# Patient Record
Sex: MALE | Race: WHITE | Employment: FULL TIME | URBAN - NONMETROPOLITAN AREA
[De-identification: names, ages, dates, MRNs, and addresses within clinical notes are randomized per-mention and may not be internally consistent; named-entity substitution may affect disease eponyms.]

---

## 2018-02-06 ENCOUNTER — HOSPITAL ENCOUNTER (EMERGENCY)
Age: 28
Discharge: HOME | End: 2018-02-06
Attending: FAMILY MEDICINE
Payer: COMMERCIAL

## 2018-02-06 VITALS
RESPIRATION RATE: 16 BRPM | BODY MASS INDEX: 27.35 KG/M2 | HEART RATE: 66 BPM | OXYGEN SATURATION: 98 % | SYSTOLIC BLOOD PRESSURE: 135 MMHG | DIASTOLIC BLOOD PRESSURE: 62 MMHG | WEIGHT: 191 LBS | TEMPERATURE: 98.3 F | HEIGHT: 70 IN

## 2018-02-06 DIAGNOSIS — S65.511A LACERATION OF BLOOD VESSEL OF LEFT INDEX FINGER, INITIAL ENCOUNTER: Primary | ICD-10-CM

## 2018-02-06 PROCEDURE — 90715 TDAP VACCINE 7 YRS/> IM: CPT | Performed by: FAMILY MEDICINE

## 2018-02-06 PROCEDURE — 6360000002 HC RX W HCPCS: Performed by: FAMILY MEDICINE

## 2018-02-06 PROCEDURE — 12001 RPR S/N/AX/GEN/TRNK 2.5CM/<: CPT

## 2018-02-06 PROCEDURE — 90471 IMMUNIZATION ADMIN: CPT | Performed by: FAMILY MEDICINE

## 2018-02-06 PROCEDURE — 99282 EMERGENCY DEPT VISIT SF MDM: CPT

## 2018-02-06 RX ADMIN — TETANUS TOXOID, REDUCED DIPHTHERIA TOXOID AND ACELLULAR PERTUSSIS VACCINE, ADSORBED 0.5 ML: 5; 2.5; 8; 8; 2.5 SUSPENSION INTRAMUSCULAR at 14:16

## 2018-02-06 ASSESSMENT — ENCOUNTER SYMPTOMS
RHINORRHEA: 0
NAUSEA: 0
EYE DISCHARGE: 0
SORE THROAT: 0
WHEEZING: 0
VOMITING: 0
EYE REDNESS: 0
ABDOMINAL PAIN: 0
DIARRHEA: 0
EYE PAIN: 0
BACK PAIN: 0
COUGH: 0
SHORTNESS OF BREATH: 0

## 2018-02-06 NOTE — ED NOTES
Pt pink, warm and dry, breathing with ease. Instructed not to get finger wet for 24 hours. Then to keep wound covered. Pt states understanding. AVS reviewed including follow up appointments, diagnosis, and care of self at home. Denies questions or concerns. Pt remains alert and oriented. Pt discharged in stable condition.       Monty Carranza RN  02/06/18 [id number]

## 2019-07-15 ENCOUNTER — OFFICE VISIT (OUTPATIENT)
Dept: FAMILY MEDICINE CLINIC | Age: 29
End: 2019-07-15
Payer: COMMERCIAL

## 2019-07-15 VITALS
WEIGHT: 184 LBS | DIASTOLIC BLOOD PRESSURE: 72 MMHG | RESPIRATION RATE: 16 BRPM | HEIGHT: 70 IN | SYSTOLIC BLOOD PRESSURE: 136 MMHG | BODY MASS INDEX: 26.34 KG/M2 | HEART RATE: 80 BPM

## 2019-07-15 DIAGNOSIS — L40.9 PSORIASIS: ICD-10-CM

## 2019-07-15 DIAGNOSIS — L40.9 PSORIASIS OF SCALP: Primary | ICD-10-CM

## 2019-07-15 PROCEDURE — 99203 OFFICE O/P NEW LOW 30 MIN: CPT | Performed by: NURSE PRACTITIONER

## 2019-07-15 RX ORDER — CLOBETASOL PROPIONATE 0.5 MG/G
AEROSOL, FOAM TOPICAL
Qty: 100 G | Refills: 5 | Status: SHIPPED | OUTPATIENT
Start: 2019-07-15

## 2019-07-15 ASSESSMENT — ENCOUNTER SYMPTOMS
COLOR CHANGE: 1
RESPIRATORY NEGATIVE: 1
EYES NEGATIVE: 1
GASTROINTESTINAL NEGATIVE: 1

## 2019-07-15 ASSESSMENT — PATIENT HEALTH QUESTIONNAIRE - PHQ9
2. FEELING DOWN, DEPRESSED OR HOPELESS: 0
SUM OF ALL RESPONSES TO PHQ QUESTIONS 1-9: 0
1. LITTLE INTEREST OR PLEASURE IN DOING THINGS: 0
SUM OF ALL RESPONSES TO PHQ QUESTIONS 1-9: 0
SUM OF ALL RESPONSES TO PHQ9 QUESTIONS 1 & 2: 0

## 2019-07-15 NOTE — PROGRESS NOTES
Left Ear: Tympanic membrane and external ear normal.   Nose: Nose normal.   Mouth/Throat: Oropharynx is clear and moist.   Neck: Normal range of motion. Neck supple. Cardiovascular: Normal rate, regular rhythm, normal heart sounds and intact distal pulses. Exam reveals no gallop and no friction rub. No murmur heard. Pulmonary/Chest: Effort normal and breath sounds normal. He has no wheezes. He has no rales. Abdominal: Soft. Bowel sounds are normal. There is no tenderness. There is no guarding. Musculoskeletal: Normal range of motion. Lymphadenopathy:     He has no cervical adenopathy. Neurological: He is alert and oriented to person, place, and time. He has normal reflexes. Skin: Skin is warm. Psychiatric: He has a normal mood and affect. Assessment:      Diagnosis Orders   1. Psoriasis of scalp  clobetasol (OLUX) 0.05 % foam   2. Psoriasis  clobetasol (OLUX) 0.05 % foam       Plan:      No follow-ups on file. No orders of the defined types were placed in this encounter. Orders Placed This Encounter   Medications    clobetasol (OLUX) 0.05 % foam     Sig: Apply topically 2 times daily. Dispense:  100 g     Refill:  5      Will start with clobetasol. If does not improve refer to derm    Patient given educational materials - seepatient instructions. Discussed use, benefit, and side effects of prescribed medications. All patient questions answered. Pt voiced understanding. Patient agreed withtreatment plan. Follow up as directed.      Electronically signed by Patria Eisenmenger, APRN - CNP on 7/15/2019 at 5:30 PM

## 2019-08-01 ENCOUNTER — TELEPHONE (OUTPATIENT)
Dept: FAMILY MEDICINE CLINIC | Age: 29
End: 2019-08-01

## 2019-08-01 RX ORDER — BETAMETHASONE DIPROPIONATE 0.5 MG/G
CREAM TOPICAL
Qty: 50 G | Refills: 2 | Status: SHIPPED | OUTPATIENT
Start: 2019-08-01 | End: 2019-08-31

## 2019-08-01 NOTE — TELEPHONE ENCOUNTER
Patient says the rx for the psoriasis is almost $100 a month out of pocket. He is wondering if there is something else that could be tried?   [facility] is [facility] 7/15  jorg[place]

## 2021-05-25 ENCOUNTER — HOSPITAL ENCOUNTER (OUTPATIENT)
Dept: GENERAL RADIOLOGY | Age: 31
Discharge: HOME | End: 2021-05-25
Payer: COMMERCIAL

## 2021-05-25 ENCOUNTER — HOSPITAL ENCOUNTER (OUTPATIENT)
Age: 31
Discharge: HOME | End: 2021-05-25
Payer: COMMERCIAL

## 2021-05-25 DIAGNOSIS — T15.92XA FOREIGN BODY OF LEFT EYE, INITIAL ENCOUNTER: ICD-10-CM

## 2021-05-25 PROCEDURE — 70030 X-RAY EYE FOR FOREIGN BODY: CPT

## 2022-07-06 ENCOUNTER — HOSPITAL ENCOUNTER (OUTPATIENT)
Dept: GENERAL RADIOLOGY | Age: 32
Discharge: HOME | End: 2022-07-06
Payer: COMMERCIAL

## 2022-07-06 ENCOUNTER — HOSPITAL ENCOUNTER (OUTPATIENT)
Age: 32
Discharge: HOME | End: 2022-07-06
Payer: COMMERCIAL

## 2022-07-06 ENCOUNTER — OFFICE VISIT (OUTPATIENT)
Dept: FAMILY MEDICINE CLINIC | Age: 32
End: 2022-07-06
Payer: COMMERCIAL

## 2022-07-06 VITALS
HEART RATE: 94 BPM | TEMPERATURE: 98.8 F | DIASTOLIC BLOOD PRESSURE: 70 MMHG | RESPIRATION RATE: 14 BRPM | SYSTOLIC BLOOD PRESSURE: 118 MMHG

## 2022-07-06 DIAGNOSIS — S90.31XA CONTUSION OF RIGHT FOOT, INITIAL ENCOUNTER: Primary | ICD-10-CM

## 2022-07-06 DIAGNOSIS — S90.31XA CONTUSION OF RIGHT FOOT, INITIAL ENCOUNTER: ICD-10-CM

## 2022-07-06 PROCEDURE — 73630 X-RAY EXAM OF FOOT: CPT

## 2022-07-06 PROCEDURE — 99213 OFFICE O/P EST LOW 20 MIN: CPT | Performed by: NURSE PRACTITIONER

## 2022-07-06 SDOH — ECONOMIC STABILITY: FOOD INSECURITY: WITHIN THE PAST 12 MONTHS, YOU WORRIED THAT YOUR FOOD WOULD RUN OUT BEFORE YOU GOT MONEY TO BUY MORE.: NEVER TRUE

## 2022-07-06 SDOH — ECONOMIC STABILITY: FOOD INSECURITY: WITHIN THE PAST 12 MONTHS, THE FOOD YOU BOUGHT JUST DIDN'T LAST AND YOU DIDN'T HAVE MONEY TO GET MORE.: NEVER TRUE

## 2022-07-06 ASSESSMENT — SOCIAL DETERMINANTS OF HEALTH (SDOH): HOW HARD IS IT FOR YOU TO PAY FOR THE VERY BASICS LIKE FOOD, HOUSING, MEDICAL CARE, AND HEATING?: NOT HARD AT ALL

## 2022-07-06 NOTE — PROGRESS NOTES
Tiffanie Almaraz is a 34 y.o. male whopresents today for :  Chief Complaint   Patient presents with    Foot Pain     right foot        HPI:     HPI  Sunday afternoon was running after child. Hit foot on base of couch. Marcia Jointer a crack sound and foot has been swollen and bruised since then. Using crutches     There is no problem list on file for this patient. History reviewed. No pertinent past medical history. Past Surgical History:   Procedure Laterality Date    HERNIA REPAIR       Family History   Problem Relation Age of Onset    Heart Attack Mother      Social History     Tobacco Use    Smoking status: Former Smoker     Start date: 1/1/2018    Smokeless tobacco: Never Used   Substance Use Topics    Alcohol use: Yes     Comment: weekly      Current Outpatient Medications   Medication Sig Dispense Refill    clobetasol (OLUX) 0.05 % foam Apply topically 2 times daily. (Patient not taking: Reported on 7/6/2022) 100 g 5     No current facility-administered medications for this visit. No Known Allergies  Health Maintenance   Topic Date Due    Varicella vaccine (1 of 2 - 2-dose childhood series) Never done    COVID-19 Vaccine (1) Never done    Depression Screen  Never done    HIV screen  Never done    Hepatitis C screen  Never done    Flu vaccine (1) 09/01/2022    DTaP/Tdap/Td vaccine (3 - Td or Tdap) 12/26/2028    Hepatitis A vaccine  Aged Out    Hepatitis B vaccine  Aged Out    Hib vaccine  Aged Out    Meningococcal (ACWY) vaccine  Aged Out    Pneumococcal 0-64 years Vaccine  Aged Out       Subjective:     Review of Systems   Musculoskeletal: Positive for arthralgias and joint swelling. Objective:     Vitals:    07/06/22 1102   BP: 118/70   Site: Left Upper Arm   Position: Sitting   Cuff Size: Large Adult   Pulse: 94   Resp: 14   Temp: 98.8 °F (37.1 °C)   TempSrc: Temporal       Physical Exam  Constitutional:       Appearance: He is well-developed. HENT:      Head: Normocephalic. Right Ear: Tympanic membrane and external ear normal.      Left Ear: Tympanic membrane and external ear normal.      Nose: Nose normal.   Cardiovascular:      Rate and Rhythm: Normal rate and regular rhythm. Heart sounds: Normal heart sounds. No murmur heard. No friction rub. No gallop. Pulmonary:      Effort: Pulmonary effort is normal.      Breath sounds: Normal breath sounds. No wheezing or rales. Abdominal:      General: Bowel sounds are normal.      Palpations: Abdomen is soft. Tenderness: There is no abdominal tenderness. There is no guarding. Musculoskeletal:         General: Normal range of motion. Cervical back: Normal range of motion and neck supple. Feet:    Lymphadenopathy:      Cervical: No cervical adenopathy. Skin:     General: Skin is warm. Neurological:      Mental Status: He is alert and oriented to person, place, and time. Deep Tendon Reflexes: Reflexes are normal and symmetric. Assessment:      Diagnosis Orders   1. Contusion of right foot, initial encounter  XR FOOT RIGHT (MIN 3 VIEWS)       Plan:      No follow-ups on file. Xray showed acute fx of articular aspect of prox phalanx. I also suspect slight buckle fracture of tarsal as well  With his foot being cool and with the fracture sent pt directly to ortho      Orders Placed This Encounter   Procedures    XR FOOT RIGHT (MIN 3 VIEWS)     Standing Status:   Future     Number of Occurrences:   1     Standing Expiration Date:   7/6/2023     No orders of the defined types were placed in this encounter. Patient given educational materials - seepatient instructions. Discussed use, benefit, and side effects of prescribed medications. All patient questions answered. Pt voiced understanding. Patient agreed withtreatment plan. Follow up as directed.      Electronically signed by [facility], LAYLA Qiu CNP on 7/6/2022 at 1:05 PM

## 2024-03-13 ENCOUNTER — OFFICE VISIT (OUTPATIENT)
Dept: FAMILY MEDICINE CLINIC | Age: 34
End: 2024-03-13
Payer: COMMERCIAL

## 2024-03-13 ENCOUNTER — NURSE ONLY (OUTPATIENT)
Dept: LAB | Age: 34
End: 2024-03-13

## 2024-03-13 VITALS
BODY MASS INDEX: 26.2 KG/M2 | HEIGHT: 70 IN | HEART RATE: 89 BPM | WEIGHT: 183 LBS | SYSTOLIC BLOOD PRESSURE: 120 MMHG | DIASTOLIC BLOOD PRESSURE: 68 MMHG | TEMPERATURE: 98.1 F | RESPIRATION RATE: 18 BRPM

## 2024-03-13 DIAGNOSIS — Z00.00 ROUTINE PHYSICAL EXAMINATION: Primary | ICD-10-CM

## 2024-03-13 DIAGNOSIS — Z00.00 ROUTINE PHYSICAL EXAMINATION: ICD-10-CM

## 2024-03-13 DIAGNOSIS — R00.2 PALPITATIONS: ICD-10-CM

## 2024-03-13 LAB
ALBUMIN SERPL BCG-MCNC: 4.7 G/DL (ref 3.5–5.1)
ALP SERPL-CCNC: 77 U/L (ref 38–126)
ALT SERPL W/O P-5'-P-CCNC: 19 U/L (ref 11–66)
ANION GAP SERPL CALC-SCNC: 10 MEQ/L (ref 8–16)
AST SERPL-CCNC: 23 U/L (ref 5–40)
BASOPHILS ABSOLUTE: 0 THOU/MM3 (ref 0–0.1)
BASOPHILS NFR BLD AUTO: 0.5 %
BILIRUB SERPL-MCNC: 2.1 MG/DL (ref 0.3–1.2)
BUN SERPL-MCNC: 11 MG/DL (ref 7–22)
CALCIUM SERPL-MCNC: 9.6 MG/DL (ref 8.5–10.5)
CHLORIDE SERPL-SCNC: 104 MEQ/L (ref 98–111)
CHOLEST SERPL-MCNC: 165 MG/DL (ref 100–199)
CO2 SERPL-SCNC: 26 MEQ/L (ref 23–33)
CREAT SERPL-MCNC: 0.9 MG/DL (ref 0.4–1.2)
DEPRECATED RDW RBC AUTO: 39.9 FL (ref 35–45)
EOSINOPHIL NFR BLD AUTO: 0.5 %
EOSINOPHILS ABSOLUTE: 0 THOU/MM3 (ref 0–0.4)
ERYTHROCYTE [DISTWIDTH] IN BLOOD BY AUTOMATED COUNT: 12.2 % (ref 11.5–14.5)
GFR SERPL CREATININE-BSD FRML MDRD: > 60 ML/MIN/1.73M2
GLUCOSE SERPL-MCNC: 86 MG/DL (ref 70–108)
HCT VFR BLD AUTO: 45.5 % (ref 42–52)
HDLC SERPL-MCNC: 49 MG/DL
HGB BLD-MCNC: 15.2 GM/DL (ref 14–18)
IMM GRANULOCYTES # BLD AUTO: 0.01 THOU/MM3 (ref 0–0.07)
IMM GRANULOCYTES NFR BLD AUTO: 0.2 %
LDLC SERPL CALC-MCNC: 101 MG/DL
LYMPHOCYTES ABSOLUTE: 0.9 THOU/MM3 (ref 1–4.8)
LYMPHOCYTES NFR BLD AUTO: 15.2 %
MCH RBC QN AUTO: 29.7 PG (ref 26–33)
MCHC RBC AUTO-ENTMCNC: 33.4 GM/DL (ref 32.2–35.5)
MCV RBC AUTO: 89 FL (ref 80–94)
MONOCYTES ABSOLUTE: 0.6 THOU/MM3 (ref 0.4–1.3)
MONOCYTES NFR BLD AUTO: 10 %
NEUTROPHILS NFR BLD AUTO: 73.6 %
NRBC BLD AUTO-RTO: 0 /100 WBC
PLATELET # BLD AUTO: 198 THOU/MM3 (ref 130–400)
PMV BLD AUTO: 8.7 FL (ref 9.4–12.4)
POTASSIUM SERPL-SCNC: 4.6 MEQ/L (ref 3.5–5.2)
PROT SERPL-MCNC: 7.2 G/DL (ref 6.1–8)
RBC # BLD AUTO: 5.11 MILL/MM3 (ref 4.7–6.1)
SEGMENTED NEUTROPHILS ABSOLUTE COUNT: 4.5 THOU/MM3 (ref 1.8–7.7)
SODIUM SERPL-SCNC: 140 MEQ/L (ref 135–145)
TRIGL SERPL-MCNC: 75 MG/DL (ref 0–199)
TSH SERPL DL<=0.005 MIU/L-ACNC: 1.38 UIU/ML (ref 0.4–4.2)
WBC # BLD AUTO: 6.1 THOU/MM3 (ref 4.8–10.8)

## 2024-03-13 PROCEDURE — 99395 PREV VISIT EST AGE 18-39: CPT | Performed by: NURSE PRACTITIONER

## 2024-03-13 SDOH — ECONOMIC STABILITY: FOOD INSECURITY: WITHIN THE PAST 12 MONTHS, THE FOOD YOU BOUGHT JUST DIDN'T LAST AND YOU DIDN'T HAVE MONEY TO GET MORE.: NEVER TRUE

## 2024-03-13 SDOH — ECONOMIC STABILITY: INCOME INSECURITY: HOW HARD IS IT FOR YOU TO PAY FOR THE VERY BASICS LIKE FOOD, HOUSING, MEDICAL CARE, AND HEATING?: NOT HARD AT ALL

## 2024-03-13 SDOH — ECONOMIC STABILITY: HOUSING INSECURITY
IN THE LAST 12 MONTHS, WAS THERE A TIME WHEN YOU DID NOT HAVE A STEADY PLACE TO SLEEP OR SLEPT IN A SHELTER (INCLUDING NOW)?: NO

## 2024-03-13 SDOH — ECONOMIC STABILITY: FOOD INSECURITY: WITHIN THE PAST 12 MONTHS, YOU WORRIED THAT YOUR FOOD WOULD RUN OUT BEFORE YOU GOT MONEY TO BUY MORE.: NEVER TRUE

## 2024-03-13 ASSESSMENT — PATIENT HEALTH QUESTIONNAIRE - PHQ9
SUM OF ALL RESPONSES TO PHQ QUESTIONS 1-9: 0
SUM OF ALL RESPONSES TO PHQ QUESTIONS 1-9: 0
2. FEELING DOWN, DEPRESSED OR HOPELESS: 0
1. LITTLE INTEREST OR PLEASURE IN DOING THINGS: 0
SUM OF ALL RESPONSES TO PHQ QUESTIONS 1-9: 0
SUM OF ALL RESPONSES TO PHQ QUESTIONS 1-9: 0
SUM OF ALL RESPONSES TO PHQ9 QUESTIONS 1 & 2: 0

## 2024-03-13 ASSESSMENT — ENCOUNTER SYMPTOMS
EYES NEGATIVE: 1
GASTROINTESTINAL NEGATIVE: 1
RESPIRATORY NEGATIVE: 1

## 2024-03-13 NOTE — PROGRESS NOTES
Mitchell Ramos is a 33 y.o. male whopresents today for :  Chief Complaint   Patient presents with    Palpitations     Patient states ongoing for years.      Vitals:    03/13/24 0901   BP: 120/68   Pulse: 89   Resp: 18   Temp: 98.1 °F (36.7 °C)       HPI:     HPI  Pt here with reports of off and on heart palpitations.  Will feel like it will suddenly beat fast.  Gets a numb sensation in chest off and on.  No angina or VAUGHN.  States first started back in his  days and had neg stress test  There is no problem list on file for this patient.    History reviewed. No pertinent past medical history.   Past Surgical History:   Procedure Laterality Date    HERNIA REPAIR       Family History   Problem Relation Age of Onset    Heart Attack Mother      Social History     Tobacco Use    Smoking status: Former     Types: Cigarettes     Start date: 1/1/2018    Smokeless tobacco: Never   Substance Use Topics    Alcohol use: Yes     Comment: weekly      Current Outpatient Medications   Medication Sig Dispense Refill    clobetasol (OLUX) 0.05 % foam Apply topically 2 times daily. (Patient not taking: Reported on 7/6/2022) 100 g 5     No current facility-administered medications for this visit.     No Known Allergies  Health Maintenance   Topic Date Due    COVID-19 Vaccine (1) Never done    Depression Screen  Never done    Flu vaccine (1) Never done    Hepatitis B vaccine (1 of 3 - 3-dose series) 03/13/2025 (Originally 1990)    DTaP/Tdap/Td vaccine (3 - Td or Tdap) 12/26/2028    Hepatitis A vaccine  Aged Out    Hib vaccine  Aged Out    HPV vaccine  Aged Out    Polio vaccine  Aged Out    Meningococcal (ACWY) vaccine  Aged Out    Pneumococcal 0-64 years Vaccine  Aged Out    Varicella vaccine  Discontinued    Hepatitis C screen  Discontinued    HIV screen  Discontinued       :     Review of Systems   Constitutional: Negative.    HENT: Negative.     Eyes: Negative.    Respiratory: Negative.     Cardiovascular:  Positive for

## 2024-04-01 ENCOUNTER — HOSPITAL ENCOUNTER (OUTPATIENT)
Age: 34
Discharge: HOME | End: 2024-04-03
Payer: COMMERCIAL

## 2024-04-01 DIAGNOSIS — R00.2 PALPITATIONS: ICD-10-CM

## 2024-04-01 PROCEDURE — 93225 XTRNL ECG REC<48 HRS REC: CPT

## 2024-04-05 LAB
ACQUISITION DURATION: NORMAL S
AVERAGE HEART RATE: 85 BPM
HOOKUP DATE: NORMAL
HOOKUP TIME: NORMAL
MAX HEART RATE TIME/DATE: NORMAL
MAX HEART RATE: 158 BPM
MIN HEART RATE TIME/DATE: NORMAL
MIN HEART RATE: 40 BPM
NUMBER OF QRS COMPLEXES: NORMAL
NUMBER OF SUPRAVENTRICULAR COUPLETS: 0
NUMBER OF SUPRAVENTRICULAR ECTOPICS: 66
NUMBER OF SUPRAVENTRICULAR ISOLATED BEATS: 30
NUMBER OF VENTRICULAR BIGEMINAL CYCLES: 0
NUMBER OF VENTRICULAR COUPLETS: 0
NUMBER OF VENTRICULAR ECTOPICS: 4